# Patient Record
Sex: MALE
[De-identification: names, ages, dates, MRNs, and addresses within clinical notes are randomized per-mention and may not be internally consistent; named-entity substitution may affect disease eponyms.]

---

## 2018-11-22 ENCOUNTER — HOSPITAL ENCOUNTER (OUTPATIENT)
Dept: HOSPITAL 92 - ERS | Age: 38
Discharge: HOME | End: 2018-11-22
Attending: ORTHOPAEDIC SURGERY
Payer: SELF-PAY
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
## 2018-11-22 DIAGNOSIS — I10: ICD-10-CM

## 2018-11-22 DIAGNOSIS — S82.51XB: Primary | ICD-10-CM

## 2018-11-22 DIAGNOSIS — Z79.899: ICD-10-CM

## 2018-11-22 LAB
ALBUMIN SERPL BCG-MCNC: 4.3 G/DL (ref 3.5–5)
ALP SERPL-CCNC: 90 U/L (ref 40–150)
ALT SERPL W P-5'-P-CCNC: 27 U/L (ref 8–55)
ANION GAP SERPL CALC-SCNC: 13 MMOL/L (ref 10–20)
AST SERPL-CCNC: 23 U/L (ref 5–34)
BASOPHILS # BLD AUTO: 0 THOU/UL (ref 0–0.2)
BASOPHILS NFR BLD AUTO: 0.4 % (ref 0–1)
BILIRUB SERPL-MCNC: 0.4 MG/DL (ref 0.2–1.2)
BUN SERPL-MCNC: 14 MG/DL (ref 8.9–20.6)
CALCIUM SERPL-MCNC: 9.7 MG/DL (ref 7.8–10.44)
CHLORIDE SERPL-SCNC: 103 MMOL/L (ref 98–107)
CO2 SERPL-SCNC: 24 MMOL/L (ref 22–29)
CREAT CL PREDICTED SERPL C-G-VRATE: 0 ML/MIN (ref 70–130)
EOSINOPHIL # BLD AUTO: 0.2 THOU/UL (ref 0–0.7)
EOSINOPHIL NFR BLD AUTO: 3 % (ref 0–10)
GLOBULIN SER CALC-MCNC: 3.8 G/DL (ref 2.4–3.5)
GLUCOSE SERPL-MCNC: 102 MG/DL (ref 70–105)
HGB BLD-MCNC: 12.8 G/DL (ref 14–18)
LYMPHOCYTES # BLD: 1.4 THOU/UL (ref 1.2–3.4)
LYMPHOCYTES NFR BLD AUTO: 18.8 % (ref 21–51)
MCH RBC QN AUTO: 28.2 PG (ref 27–31)
MCV RBC AUTO: 84.8 FL (ref 78–98)
MONOCYTES # BLD AUTO: 0.5 THOU/UL (ref 0.11–0.59)
MONOCYTES NFR BLD AUTO: 6.5 % (ref 0–10)
NEUTROPHILS # BLD AUTO: 5.3 THOU/UL (ref 1.4–6.5)
NEUTROPHILS NFR BLD AUTO: 71.4 % (ref 42–75)
PLATELET # BLD AUTO: 191 THOU/UL (ref 130–400)
POTASSIUM SERPL-SCNC: 3.8 MMOL/L (ref 3.5–5.1)
RBC # BLD AUTO: 4.54 MILL/UL (ref 4.7–6.1)
SODIUM SERPL-SCNC: 136 MMOL/L (ref 136–145)
WBC # BLD AUTO: 7.4 THOU/UL (ref 4.8–10.8)

## 2018-11-22 PROCEDURE — 85025 COMPLETE CBC W/AUTO DIFF WBC: CPT

## 2018-11-22 PROCEDURE — 80053 COMPREHEN METABOLIC PANEL: CPT

## 2018-11-22 PROCEDURE — 90715 TDAP VACCINE 7 YRS/> IM: CPT

## 2018-11-22 PROCEDURE — 96375 TX/PRO/DX INJ NEW DRUG ADDON: CPT

## 2018-11-22 PROCEDURE — 36415 COLL VENOUS BLD VENIPUNCTURE: CPT

## 2018-11-22 PROCEDURE — 76001: CPT

## 2018-11-22 PROCEDURE — 96365 THER/PROPH/DIAG IV INF INIT: CPT

## 2018-11-22 PROCEDURE — 90471 IMMUNIZATION ADMIN: CPT

## 2018-11-22 PROCEDURE — 0QSG0ZZ REPOSITION RIGHT TIBIA, OPEN APPROACH: ICD-10-PCS | Performed by: ORTHOPAEDIC SURGERY

## 2018-11-22 NOTE — RAD
RIGHT ANKLE THREE VIEWS:

 

History: Injury from nail gun. 

 

Comparison: None. 

 

FINDINGS:

There is a nail through the anterior tibial plafond extending to the talar neck. This is crossing the
 ankle joint. Nail appears to have gone through the cortex of the fibula given that there is some pun
ctate radiopaque material on the lateral and medial cortex of the fibula. 

 

IMPRESSION: 

Radiopaque nail through the anterior tibial plafond crossing the ankle joint into the talar neck. 

 

POS: RAMONE

## 2018-11-22 NOTE — RAD
LEFT ANKLE ONE VIEW:

11/22/18

 

HISTORY: 

Removal of nail from ankle, post nail removal.

 

A single oblique view of the ankle demonstrates no evidence for residual metal foreign body . The pre
viously noted nail has been removed. 

 

IMPRESSION:  

Removal of the nail from the ankle. 

 

 

 

POS: RRE

## 2018-11-23 NOTE — HP
HISTORY OF PRESENT ILLNESS:  The patient was at work today and was using a nail gun.  He had the safe
ty wired, bumped it and shot a nail through his leg into his talus.  The nail was buried underneath t
he skin and buried in the bone through the joint.

 

PAST MEDICAL HISTORY:  Positive for hypertension.

 

CURRENT MEDICATIONS:  He does not recall the name.

 

ALLERGIES TO MEDICATIONS:  None.

 

SOCIAL HISTORY:  He does not smoke or drink significantly.  He has good family support system.

 

REVIEW OF SYSTEMS:  Negative for other injuries, head and neck trauma, or fall.

 

PHYSICAL EXAMINATION:

GENERAL:  Pleasant gentleman, in no distress.

HEENT:  Normocephalic, atraumatic.

LUNGS:  Clear.

HEART:  Regular.

ABDOMEN:  Soft, nontender.

EXTREMITIES:  Right ankle shows pain with any range of motion, just a very small puncture wound, inab
ility to move the right foot.  Neurovascular status is intact.

 

IMAGING STUDIES:  Radiographs showed nail into the ankle joint and buried into the bone of the talus.


 

ASSESSMENT:  This is probably a countersunk finishing nail, will have to open his ankle joint and ope
n his talus to remove this and irrigate, and he will be discharged home tonight as well.

 

PLAN:  Follow up in my office in a few days.  He will be discharged home on [place] and Keflex.

## 2018-11-23 NOTE — OP
PREOPERATIVE DIAGNOSES:  Open ankle joint and open tibia fracture due to a puncture with a nail.

 

POSTOPERATIVE DIAGNOSES:  Open ankle joint and open tibia fracture due to a puncture with a nail.

 

SURGEON:  Yaw Donahue M.D.

 

ANESTHESIA:  General.

 

BLOOD LOSS:  Minimal.

 

SPECIMEN:  None.

 

DRAINS:  None.

 

COMPLICATIONS:  None.

 

DESCRIPTION OF PROCEDURE:  Patient was taken to the operating where general anesthesia was induced.  
Right leg was prepped and draped in the usual sterile fashion.  I elevated the tourniquet to 300 mmHg
.  I made a lateral arthrotomy, taking advantage of the open wound and also down the subperiosteal sp
ace on the tibia.  The head of the nail was buried into the tibia.  I had to cut the head of the nail
 off.  I opened the ankle joint.  The front of the tibia was broken off right at the ankle joint wher
e the nail went through into the neck of the talus.  I could not pull the nail out from the tibial si
de.  This was a wire holli-type nail and very soft metal.  The head was removed.  I cut the holli in th
e joint and delivered it into the joint from both ends.  I removed broken piece of tibial from the fadi
int.  I curetted the joint and I irrigated copiously.  I then released tourniquet and hemostasis obta
ined.  I injected Marcaine _____  and the skin was closed with 2-0 Vicryl and 3-0 nylon.  Sterile isidoro
ssings applied.  Postoperative plan is for Keflex, Vicodin.  Follow up in my office in 5 days.